# Patient Record
Sex: FEMALE | Race: WHITE | NOT HISPANIC OR LATINO | Employment: OTHER | URBAN - NONMETROPOLITAN AREA
[De-identification: names, ages, dates, MRNs, and addresses within clinical notes are randomized per-mention and may not be internally consistent; named-entity substitution may affect disease eponyms.]

---

## 2020-09-21 ENCOUNTER — APPOINTMENT (OUTPATIENT)
Dept: CT IMAGING | Facility: HOSPITAL | Age: 75
End: 2020-09-21

## 2020-09-21 ENCOUNTER — HOSPITAL ENCOUNTER (EMERGENCY)
Facility: HOSPITAL | Age: 75
Discharge: HOME | End: 2020-09-21
Attending: FAMILY MEDICINE | Admitting: FAMILY MEDICINE

## 2020-09-21 ENCOUNTER — APPOINTMENT (OUTPATIENT)
Dept: GENERAL RADIOLOGY | Facility: HOSPITAL | Age: 75
End: 2020-09-21

## 2020-09-21 VITALS
WEIGHT: 194 LBS | DIASTOLIC BLOOD PRESSURE: 73 MMHG | BODY MASS INDEX: 36.63 KG/M2 | TEMPERATURE: 98.4 F | HEIGHT: 61 IN | SYSTOLIC BLOOD PRESSURE: 170 MMHG | OXYGEN SATURATION: 98 % | RESPIRATION RATE: 18 BRPM | HEART RATE: 89 BPM

## 2020-09-21 DIAGNOSIS — R53.83 FATIGUE, UNSPECIFIED TYPE: Primary | ICD-10-CM

## 2020-09-21 DIAGNOSIS — R16.0 LIVER MASS: ICD-10-CM

## 2020-09-21 DIAGNOSIS — R50.9 RECURRENT FEVER OF UNKNOWN ETIOLOGY: ICD-10-CM

## 2020-09-21 DIAGNOSIS — R59.9 ENLARGED LYMPH NODE: ICD-10-CM

## 2020-09-21 LAB
ALBUMIN SERPL-MCNC: 3.1 G/DL (ref 3.5–5.2)
ALBUMIN/GLOB SERPL: 0.8 G/DL
ALP SERPL-CCNC: 309 U/L (ref 39–117)
ALT SERPL W P-5'-P-CCNC: 39 U/L (ref 1–33)
ANION GAP SERPL CALCULATED.3IONS-SCNC: 10 MMOL/L (ref 5–15)
AST SERPL-CCNC: 30 U/L (ref 1–32)
BASOPHILS # BLD AUTO: 0.14 10*3/MM3 (ref 0–0.2)
BASOPHILS NFR BLD AUTO: 0.7 % (ref 0–1.5)
BILIRUB SERPL-MCNC: 0.2 MG/DL (ref 0–1.2)
BILIRUB UR QL STRIP: NEGATIVE
BUN SERPL-MCNC: 9 MG/DL (ref 8–23)
BUN/CREAT SERPL: 21.4 (ref 7–25)
CALCIUM SPEC-SCNC: 9.3 MG/DL (ref 8.6–10.5)
CHLORIDE SERPL-SCNC: 101 MMOL/L (ref 98–107)
CLARITY UR: CLEAR
CO2 SERPL-SCNC: 28 MMOL/L (ref 22–29)
COLOR UR: YELLOW
CREAT SERPL-MCNC: 0.42 MG/DL (ref 0.57–1)
D-DIMER, QUANTITATIVE (MAD,POW, STR): 2466 NG/ML (FEU) (ref 0–470)
DEPRECATED RDW RBC AUTO: 47.2 FL (ref 37–54)
EOSINOPHIL # BLD AUTO: 0.35 10*3/MM3 (ref 0–0.4)
EOSINOPHIL NFR BLD AUTO: 1.7 % (ref 0.3–6.2)
ERYTHROCYTE [DISTWIDTH] IN BLOOD BY AUTOMATED COUNT: 15.2 % (ref 12.3–15.4)
GFR SERPL CREATININE-BSD FRML MDRD: 147 ML/MIN/1.73
GLOBULIN UR ELPH-MCNC: 3.8 GM/DL
GLUCOSE SERPL-MCNC: 107 MG/DL (ref 65–99)
GLUCOSE UR STRIP-MCNC: NEGATIVE MG/DL
HCT VFR BLD AUTO: 29.9 % (ref 34–46.6)
HGB BLD-MCNC: 9.7 G/DL (ref 12–15.9)
HGB UR QL STRIP.AUTO: NEGATIVE
HOLD SPECIMEN: NORMAL
IMM GRANULOCYTES # BLD AUTO: 0.91 10*3/MM3 (ref 0–0.05)
IMM GRANULOCYTES NFR BLD AUTO: 4.4 % (ref 0–0.5)
KETONES UR QL STRIP: NEGATIVE
LEUKOCYTE ESTERASE UR QL STRIP.AUTO: NEGATIVE
LIPASE SERPL-CCNC: 47 U/L (ref 13–60)
LYMPHOCYTES # BLD AUTO: 2.71 10*3/MM3 (ref 0.7–3.1)
LYMPHOCYTES NFR BLD AUTO: 13.2 % (ref 19.6–45.3)
MAGNESIUM SERPL-MCNC: 1.9 MG/DL (ref 1.6–2.4)
MCH RBC QN AUTO: 27.3 PG (ref 26.6–33)
MCHC RBC AUTO-ENTMCNC: 32.4 G/DL (ref 31.5–35.7)
MCV RBC AUTO: 84.2 FL (ref 79–97)
MONOCYTES # BLD AUTO: 1.78 10*3/MM3 (ref 0.1–0.9)
MONOCYTES NFR BLD AUTO: 8.7 % (ref 5–12)
NEUTROPHILS NFR BLD AUTO: 14.65 10*3/MM3 (ref 1.7–7)
NEUTROPHILS NFR BLD AUTO: 71.3 % (ref 42.7–76)
NITRITE UR QL STRIP: NEGATIVE
NRBC BLD AUTO-RTO: 0 /100 WBC (ref 0–0.2)
NT-PROBNP SERPL-MCNC: 282.7 PG/ML (ref 0–1800)
PH UR STRIP.AUTO: 6 [PH] (ref 5–9)
PLATELET # BLD AUTO: 597 10*3/MM3 (ref 140–450)
PMV BLD AUTO: 9.4 FL (ref 6–12)
POTASSIUM SERPL-SCNC: 3.7 MMOL/L (ref 3.5–5.2)
PROT SERPL-MCNC: 6.9 G/DL (ref 6–8.5)
PROT UR QL STRIP: NEGATIVE
RBC # BLD AUTO: 3.55 10*6/MM3 (ref 3.77–5.28)
SODIUM SERPL-SCNC: 139 MMOL/L (ref 136–145)
SP GR UR STRIP: 1.01 (ref 1–1.03)
TROPONIN T SERPL-MCNC: <0.01 NG/ML (ref 0–0.03)
TROPONIN T SERPL-MCNC: <0.01 NG/ML (ref 0–0.03)
UROBILINOGEN UR QL STRIP: NORMAL
WBC # BLD AUTO: 20.54 10*3/MM3 (ref 3.4–10.8)

## 2020-09-21 PROCEDURE — 71046 X-RAY EXAM CHEST 2 VIEWS: CPT

## 2020-09-21 PROCEDURE — 81003 URINALYSIS AUTO W/O SCOPE: CPT | Performed by: NURSE PRACTITIONER

## 2020-09-21 PROCEDURE — 93005 ELECTROCARDIOGRAM TRACING: CPT | Performed by: NURSE PRACTITIONER

## 2020-09-21 PROCEDURE — 93010 ELECTROCARDIOGRAM REPORT: CPT | Performed by: INTERNAL MEDICINE

## 2020-09-21 PROCEDURE — 0 IOPAMIDOL PER 1 ML: Performed by: FAMILY MEDICINE

## 2020-09-21 PROCEDURE — 80053 COMPREHEN METABOLIC PANEL: CPT | Performed by: NURSE PRACTITIONER

## 2020-09-21 PROCEDURE — 99284 EMERGENCY DEPT VISIT MOD MDM: CPT

## 2020-09-21 PROCEDURE — 83735 ASSAY OF MAGNESIUM: CPT | Performed by: NURSE PRACTITIONER

## 2020-09-21 PROCEDURE — 85379 FIBRIN DEGRADATION QUANT: CPT | Performed by: NURSE PRACTITIONER

## 2020-09-21 PROCEDURE — 71275 CT ANGIOGRAPHY CHEST: CPT

## 2020-09-21 PROCEDURE — 83880 ASSAY OF NATRIURETIC PEPTIDE: CPT | Performed by: NURSE PRACTITIONER

## 2020-09-21 PROCEDURE — 85025 COMPLETE CBC W/AUTO DIFF WBC: CPT | Performed by: NURSE PRACTITIONER

## 2020-09-21 PROCEDURE — 83690 ASSAY OF LIPASE: CPT | Performed by: NURSE PRACTITIONER

## 2020-09-21 PROCEDURE — 84484 ASSAY OF TROPONIN QUANT: CPT | Performed by: NURSE PRACTITIONER

## 2020-09-21 RX ORDER — SODIUM CHLORIDE 0.9 % (FLUSH) 0.9 %
10 SYRINGE (ML) INJECTION AS NEEDED
Status: DISCONTINUED | OUTPATIENT
Start: 2020-09-21 | End: 2020-09-21 | Stop reason: HOSPADM

## 2020-09-21 RX ADMIN — IOPAMIDOL 71 ML: 755 INJECTION, SOLUTION INTRAVENOUS at 12:50

## 2020-09-21 NOTE — ED PROVIDER NOTES
Subjective   Patient presents to the ER for c/o fever and night sweats for the past 2 weeks. She states she started feeling bad in July and has been on multiple rounds of antibiotics and steroids since. Per chart review patient's treatments have included:  7/12/20: Decadron, DepoMedrol, Omnicef for sinusitis and UTI  7/16/20: Zithromax, Prednisone, Doxycyline for pneumonia  7/31/20: Rocephin 2 GM, Solumedrol IV for pneumonia  8/1/20: Rocephin 2 GM IV  8/2/20: Rocephin 2 GM IV  8/3/20: Rocephin 2 GM IV, Prednisone, Cefdinir  8/26/20: Ceftin for possible bronchitis  8/31/20: Doxycycline for unspecified fever  9/16/20: Cefdinir for UTI  Testing has included a CBC and CMP on 7/30/20 which showed WBC at 16.4. COVID testing on 7/17 and 8/31 which were both negative, KATI and Cyclic Citrullinaton Peptied on 9/16 which were both negative, and UA on 7/30 and 9/16.  She also has CXR completed on 7/17, 7/30, and 8/31.   She states she continues with a dry non productive cough (productive yesterday x1) and shortness of breath since the beginning of July. She sees Dr. Christy (allergist in [place]) for her chronic cough. She reports 2 weeks ago she started with a daily fever of low 100 every day at noon and develop generalized body aches. She will take a Tylenol at that time. She states then again around [id number] her fever will return and she will take Aleve. Then around 0300 in the morning she will wake up with night sweats and her bed will be wet from her sweating. She states she will have to change clothes and her sheets. This has been a daily occurrence and her PCP felt she should come to the ER for further evaluation and work up.           Review of Systems   Constitutional: Positive for chills, fatigue and fever. Negative for activity change and unexpected weight change.   HENT: Negative.    Respiratory: Positive for shortness of breath. Negative for cough.    Cardiovascular: Negative.    Gastrointestinal: Negative for  abdominal pain.   Genitourinary: Positive for frequency. Negative for decreased urine volume, difficulty urinating, dysuria and pelvic pain.   Musculoskeletal: Positive for myalgias (generalized when fever present).   Skin: Negative.    Neurological: Negative.    Psychiatric/Behavioral: Negative.        Past Medical History:   Diagnosis Date   • Acute bronchitis    • Calcaneovalgus deformity of foot    • Cough    • H/O bone density study     [id number]   • Hammer toe    • Hyperlipidemia    • Ingrowing nail    • Other hammer toe(s) (acquired), unspecified foot    • Pes planus    • Pneumonia    • Rash and other nonspecific skin eruption    • Screening for osteoporosis        Allergies   Allergen Reactions   • Penicillins    • Pneumococcal Vaccines    • Quibron-T [Theophylline]        Past Surgical History:   Procedure Laterality Date   • ANKLE SURGERY     • CHOLECYSTECTOMY     • FOOT SURGERY  11/19/2013    Hammertoe correction second toe left foot with absorbable pin. Flexor tenotomy left third toe. Partial permanent nail avulsion both borders left hallux   • HEMORRHOIDECTOMY     • HYSTERECTOMY     • NOSE SURGERY         History reviewed. No pertinent family history.    Social History     Socioeconomic History   • Marital status:      Spouse name: Not on file   • Number of children: Not on file   • Years of education: Not on file   • Highest education level: Not on file   Tobacco Use   • Smoking status: Never Smoker   Substance and Sexual Activity   • Alcohol use: Never     Frequency: Never   • Drug use: Never           Objective   Physical Exam  Vitals signs and nursing note reviewed.   Constitutional:       General: She is not in acute distress.     Appearance: Normal appearance. She is well-developed. She is obese. She is not ill-appearing.   HENT:      Head: Normocephalic and atraumatic.   Neck:      Musculoskeletal: Normal range of motion and neck supple.   Cardiovascular:      Rate and Rhythm: Normal rate  and regular rhythm.      Heart sounds: Normal heart sounds. No murmur.   Pulmonary:      Effort: Pulmonary effort is normal. No respiratory distress.      Breath sounds: Normal breath sounds. No wheezing.      Comments: Appears short of breath with exertion  Abdominal:      General: Bowel sounds are normal. There is no distension.      Palpations: Abdomen is soft.      Tenderness: There is no abdominal tenderness.      Comments: Non tender to palpation   Musculoskeletal: Normal range of motion.   Skin:     General: Skin is warm and dry.      Capillary Refill: Capillary refill takes less than 2 seconds.   Neurological:      Mental Status: She is alert and oriented to person, place, and time.      Cranial Nerves: No cranial nerve deficit.      Sensory: No sensory deficit.      Motor: No weakness.      Coordination: Coordination normal.      Gait: Gait normal.   Psychiatric:         Behavior: Behavior normal.         Thought Content: Thought content normal.         Judgment: Judgment normal.         Procedures  Results for orders placed or performed during the hospital encounter of 09/21/20   Comprehensive Metabolic Panel    Specimen: Blood   Result Value Ref Range    Glucose 107 (H) 65 - 99 mg/dL    BUN 9 8 - 23 mg/dL    Creatinine 0.42 (L) 0.57 - 1.00 mg/dL    Sodium 139 136 - 145 mmol/L    Potassium 3.7 3.5 - 5.2 mmol/L    Chloride 101 98 - 107 mmol/L    CO2 28.0 22.0 - 29.0 mmol/L    Calcium 9.3 8.6 - 10.5 mg/dL    Total Protein 6.9 6.0 - 8.5 g/dL    Albumin 3.10 (L) 3.50 - 5.20 g/dL    ALT (SGPT) 39 (H) 1 - 33 U/L    AST (SGOT) 30 1 - 32 U/L    Alkaline Phosphatase 309 (H) 39 - 117 U/L    Total Bilirubin 0.2 0.0 - 1.2 mg/dL    eGFR Non African Amer 147 >60 mL/min/1.73    Globulin 3.8 gm/dL    A/G Ratio 0.8 g/dL    BUN/Creatinine Ratio 21.4 7.0 - 25.0    Anion Gap 10.0 5.0 - 15.0 mmol/L   Lipase    Specimen: Blood   Result Value Ref Range    Lipase 47 13 - 60 U/L   Urinalysis With Microscopic If Indicated (No  Culture) - Urine, Clean Catch    Specimen: Urine, Clean Catch   Result Value Ref Range    Color, UA Yellow Yellow, Straw, Dark Yellow, Rashida    Appearance, UA Clear Clear    pH, UA 6.0 5.0 - 9.0    Specific Gravity, UA 1.008 1.003 - 1.030    Glucose, UA Negative Negative    Ketones, UA Negative Negative    Bilirubin, UA Negative Negative    Blood, UA Negative Negative    Protein, UA Negative Negative    Leuk Esterase, UA Negative Negative    Nitrite, UA Negative Negative    Urobilinogen, UA 0.2 E.U./dL 0.2 - 1.0 E.U./dL   BNP    Specimen: Blood   Result Value Ref Range    proBNP 282.7 0.0-1,800.0 pg/mL   D-dimer, Quantitative    Specimen: Blood   Result Value Ref Range    D-Dimer, Quantitative 2,466 (H) 0 - 470 ng/mL (FEU)   Troponin    Specimen: Blood   Result Value Ref Range    Troponin T <0.010 0.000 - 0.030 ng/mL   Troponin    Specimen: Arm, Left; Blood   Result Value Ref Range    Troponin T <0.010 0.000 - 0.030 ng/mL   Magnesium    Specimen: Blood   Result Value Ref Range    Magnesium 1.9 1.6 - 2.4 mg/dL   CBC Auto Differential    Specimen: Blood   Result Value Ref Range    WBC 20.54 (H) 3.40 - 10.80 10*3/mm3    RBC 3.55 (L) 3.77 - 5.28 10*6/mm3    Hemoglobin 9.7 (L) 12.0 - 15.9 g/dL    Hematocrit 29.9 (L) 34.0 - 46.6 %    MCV 84.2 79.0 - 97.0 fL    MCH 27.3 26.6 - 33.0 pg    MCHC 32.4 31.5 - 35.7 g/dL    RDW 15.2 12.3 - 15.4 %    RDW-SD 47.2 37.0 - 54.0 fl    MPV 9.4 6.0 - 12.0 fL    Platelets 597 (H) 140 - 450 10*3/mm3    Neutrophil % 71.3 42.7 - 76.0 %    Lymphocyte % 13.2 (L) 19.6 - 45.3 %    Monocyte % 8.7 5.0 - 12.0 %    Eosinophil % 1.7 0.3 - 6.2 %    Basophil % 0.7 0.0 - 1.5 %    Immature Grans % 4.4 (H) 0.0 - 0.5 %    Neutrophils, Absolute 14.65 (H) 1.70 - 7.00 10*3/mm3    Lymphocytes, Absolute 2.71 0.70 - 3.10 10*3/mm3    Monocytes, Absolute 1.78 (H) 0.10 - 0.90 10*3/mm3    Eosinophils, Absolute 0.35 0.00 - 0.40 10*3/mm3    Basophils, Absolute 0.14 0.00 - 0.20 10*3/mm3    Immature Grans, Absolute 0.91  (H) 0.00 - 0.05 10*3/mm3    nRBC 0.0 0.0 - 0.2 /100 WBC   Gold Top - SST   Result Value Ref Range    Extra Tube Hold for add-ons.      Xr Chest 2 View    Result Date: 9/21/2020  Narrative: PROCEDURE: Chest x-ray with two views. INDICATION: Shortness of breath. Fever. COMPARISON: 8/31/2020 chest x-ray and earlier exams dating to 12/9/2015.  Heart size normal with normal pulmonary vascularity. New findings of mild subsegmental atelectasis right base and mild infiltrate peripherally near the CP angle. Left lung clear. No pleural effusion. Bony structures unremarkable.     Impression: New findings of mild right basilar atelectasis and infiltrate. [id number] Electronically signed by:  Griffin Stovall MD  9/21/2020 10:49 AM CDT Workstation: [id number]    Ct Angiogram Chest    Result Date: 9/21/2020  Narrative: PROCEDURE: CT ANGIOGRAM CHEST .      EXAM:  Computed Tomography with CTA       REGION:  Chest     INDICATION:   Elevated d-dimer, shortness of breath  - rule out pulmonary embolism     CORRELATIVE IMAGING:  None                        TECHNIQUE:           - PE / vascular protocol             - reconstructions:  axial, coronal, sagittal, obliques   - computer-generated 3D reconstructions (MIPS) were performed.            - contrast:  intravenous 71 mL of Isovue-370                     This exam was performed according to our departmental dose-optimization program, which includes automated exposure control, adjustment of the mA and/or kV according to patient size and/or use of iterative reconstruction technique. DLP is 411.9           COMMENTS:                           - Pulmonary arterial system:     - Main pulmonary artery trunk:  negative     - Left, right main pulmonary arteries: Equivocal nonocclusive filling defect in right middle lobar branch     -Limited evaluation of lobar and segmental arteries due to contrast phase. Most of the contrast is in the SVC.  - Systemic vascularity (as visualized):      - Aorta:   grossly negative / normal caliber / no dissection      - roots of great vessels:  grossly negative / normal caliber     - SVC / IVC:  grossly negative / normal caliber  Heart is mildly enlarged. Small pericardial effusion.    - Misc (limited visualization):     - pulmonary parenchyma:  negative there is an ovoid soft tissue attenuating structure adjacent to the left neural foramen at T4-T5.     - pleura:  Trace right pleural effusion     - mediastinal / bethany: Enlarged precarinal lymph node measuring 1.5 x 2.0 cm in greatest axial dimension    - neck, inferior:  grossly wnl     - subdiaphragmatic structures: Low-attenuation masslike lesion in the posterior medial right lobe the liver measuring at least 8.2 x 7.7 x 9.3 cm. This may represent multiple adjacent lesions or one large lobulated lesion. This is of uncertain etiology, and only faintly visualized due to contrast phase  - osseous:  No acute abnormality identified   .      Impression: 1.  No evidence of large central pulmonary embolism, with some limitation of evaluation in lobar and especially segmental branches due to contrast phase. An equivocal nonocclusive filling defect is seen in a right middle lobar branch , best seen on axial images (CT series, image #68) 2.  No evidence of acute pathology associated with the visualized aorta.    3. Ovoid, soft tissue attenuation structure posterior medially in the left upper thorax, adjacent to the neural foramina at T4-T5. Is difficult to ascertain the origin of this structure, which could represent a pulmonary nodule, but could also be neural in etiology as a schwannoma or neurofibroma, which are less frequently seen in the thoracic than the cervical and lumbar spine. 4. Mildly enlarged or vascular lymph node of uncertain etiology or clinical evidence. 5. Large area of low attenuation within the right lobe of the liver, thought unlikely to represent an area of focal fatty infiltration, appear more masslike than  typically seen for fatty infiltration, and the lesion may be lobulated or there may be multiple adjacent lesions. Hepatic malignancy, either benign or metastatic, is not excluded. Multiphasic contrast enhanced CT or MRI may be helpful in further evaluation 6. Small hiatal hernia 7. Small pericardial effusion 8. Small right pleural effusion 9. Please see findings section above for further details Findings discussed with Soumya James in the emergency department at 2:40 PM EST on 9/21/2020 Electronically signed by:  Lucia Rodriguez MD  9/21/2020 1:46 PM CDT Workstation: [id number]             ED Course  ED Course as of Sep 21 2240   Mon Sep 21, 2020   1324 Patient with multiple rounds of steroids in the past several months.    WBC(!): 20.54 [SH]   1542 Rectal exam completed with tech in room. Negative hemoccult.     [SH]   1552 Patient reviewed and discussed with Dr. Pang. Patient WBC is felt to be steroid related at this time. Occult blood is negative. Patient will require further work up outpatient for liver mass and CTA findings.     [SH]   1553 Work up discussed with patient. Offered possible admission but patient states she would prefer to go home and follow up with Dr. Garcia tomorrow for further work up and evaluation. I feel this is appropriate. Advised to call for appointment. Discussed with patient when to return to ER and patient verbalized understanding.     [SH]      ED Course User Index  [SH] Soumya James, PAULINO                                           [facility]    Final diagnoses:   Fatigue, unspecified type   Recurrent fever of unknown etiology   Liver mass   Enlarged lymph node            Soumya James APRN  09/21/20 2240

## 2020-09-21 NOTE — ED NOTES
Patient presents to ED with complaint of fever for 2 weeks and generalized weakness. She states she was worked up at Dr Vines office at [facility] and he sent her here for more testing. She states her fever will come around noon and she will take a tylenol and in the afternoon she will have to take aleeve due to the fever returning. She states she is having night sweats. She states she is taking Cefdenir 300 mg BID for a urinary tract infection. She states she had pneumonia 3-4 weeks ago.      Sheila Escamilla RN  09/21/20 [id number]       Sheila Escamilla RN  09/21/20 4257

## 2020-09-21 NOTE — DISCHARGE INSTRUCTIONS
Your work up today showed her hemoglobin and hematocrit was low. Your stool was negative for blood. You CT showed an enlarged lymph node and a mass in your liver. Follow up with your primary care tomorrow for further evaluation and treatment. Return to the ER if symptoms worsen.

## 2020-10-05 PROBLEM — A49.1 STREPTOCOCCUS INFECTION: Status: ACTIVE | Noted: 2020-10-05

## 2020-10-06 ENCOUNTER — INFUSION (OUTPATIENT)
Dept: ONCOLOGY | Facility: HOSPITAL | Age: 75
End: 2020-10-06

## 2020-10-06 VITALS
RESPIRATION RATE: 22 BRPM | TEMPERATURE: 97.9 F | DIASTOLIC BLOOD PRESSURE: 69 MMHG | SYSTOLIC BLOOD PRESSURE: 164 MMHG | OXYGEN SATURATION: 96 % | HEART RATE: 83 BPM

## 2020-10-06 DIAGNOSIS — A49.1 STREPTOCOCCUS INFECTION: Primary | ICD-10-CM

## 2020-10-06 PROCEDURE — 25010000002 CEFTRIAXONE PER 250 MG: Performed by: INTERNAL MEDICINE

## 2020-10-06 PROCEDURE — 96365 THER/PROPH/DIAG IV INF INIT: CPT | Performed by: NURSE PRACTITIONER

## 2020-10-06 RX ADMIN — CEFTRIAXONE SODIUM 2 G: 2 INJECTION, POWDER, FOR SOLUTION INTRAMUSCULAR; INTRAVENOUS at 10:45

## 2020-10-07 ENCOUNTER — INFUSION (OUTPATIENT)
Dept: ONCOLOGY | Facility: HOSPITAL | Age: 75
End: 2020-10-07

## 2020-10-07 VITALS
DIASTOLIC BLOOD PRESSURE: 67 MMHG | TEMPERATURE: 97 F | SYSTOLIC BLOOD PRESSURE: 154 MMHG | RESPIRATION RATE: 18 BRPM | HEART RATE: 95 BPM

## 2020-10-07 DIAGNOSIS — A49.1 STREPTOCOCCUS INFECTION: Primary | ICD-10-CM

## 2020-10-07 PROCEDURE — 96365 THER/PROPH/DIAG IV INF INIT: CPT | Performed by: INTERNAL MEDICINE

## 2020-10-07 PROCEDURE — 25010000002 CEFTRIAXONE PER 250 MG: Performed by: INTERNAL MEDICINE

## 2020-10-07 RX ADMIN — CEFTRIAXONE SODIUM 2 G: 2 INJECTION, POWDER, FOR SOLUTION INTRAMUSCULAR; INTRAVENOUS at 09:34

## 2020-10-08 ENCOUNTER — INFUSION (OUTPATIENT)
Dept: ONCOLOGY | Facility: HOSPITAL | Age: 75
End: 2020-10-08

## 2020-10-08 VITALS
TEMPERATURE: 98.3 F | DIASTOLIC BLOOD PRESSURE: 66 MMHG | SYSTOLIC BLOOD PRESSURE: 173 MMHG | RESPIRATION RATE: 16 BRPM | HEART RATE: 97 BPM

## 2020-10-08 DIAGNOSIS — A49.1 STREPTOCOCCUS INFECTION: Primary | ICD-10-CM

## 2020-10-08 PROCEDURE — 96365 THER/PROPH/DIAG IV INF INIT: CPT | Performed by: NURSE PRACTITIONER

## 2020-10-08 PROCEDURE — 25010000002 CEFTRIAXONE PER 250 MG: Performed by: INTERNAL MEDICINE

## 2020-10-08 RX ADMIN — CEFTRIAXONE SODIUM 2 G: 2 INJECTION, POWDER, FOR SOLUTION INTRAMUSCULAR; INTRAVENOUS at 13:37

## 2020-10-09 ENCOUNTER — INFUSION (OUTPATIENT)
Dept: ONCOLOGY | Facility: HOSPITAL | Age: 75
End: 2020-10-09

## 2020-10-09 VITALS
RESPIRATION RATE: 22 BRPM | DIASTOLIC BLOOD PRESSURE: 67 MMHG | SYSTOLIC BLOOD PRESSURE: 163 MMHG | TEMPERATURE: 97.8 F | HEART RATE: 98 BPM

## 2020-10-09 DIAGNOSIS — A49.1 STREPTOCOCCUS INFECTION: Primary | ICD-10-CM

## 2020-10-09 PROCEDURE — 25010000002 CEFTRIAXONE PER 250 MG: Performed by: INTERNAL MEDICINE

## 2020-10-09 PROCEDURE — 96365 THER/PROPH/DIAG IV INF INIT: CPT | Performed by: NURSE PRACTITIONER

## 2020-10-09 RX ADMIN — CEFTRIAXONE SODIUM 2 G: 2 INJECTION, POWDER, FOR SOLUTION INTRAMUSCULAR; INTRAVENOUS at 13:38

## 2020-10-10 ENCOUNTER — INFUSION (OUTPATIENT)
Dept: ONCOLOGY | Facility: HOSPITAL | Age: 75
End: 2020-10-10

## 2020-10-10 VITALS — DIASTOLIC BLOOD PRESSURE: 72 MMHG | HEART RATE: 100 BPM | SYSTOLIC BLOOD PRESSURE: 158 MMHG | TEMPERATURE: 96.8 F

## 2020-10-10 DIAGNOSIS — A49.1 STREPTOCOCCUS INFECTION: Primary | ICD-10-CM

## 2020-10-10 PROCEDURE — 25010000002 CEFTRIAXONE PER 250 MG: Performed by: INTERNAL MEDICINE

## 2020-10-10 PROCEDURE — 96365 THER/PROPH/DIAG IV INF INIT: CPT | Performed by: INTERNAL MEDICINE

## 2020-10-10 RX ADMIN — CEFTRIAXONE SODIUM 2 G: 2 INJECTION, POWDER, FOR SOLUTION INTRAMUSCULAR; INTRAVENOUS at 10:18

## 2020-10-11 ENCOUNTER — INFUSION (OUTPATIENT)
Dept: ONCOLOGY | Facility: HOSPITAL | Age: 75
End: 2020-10-11

## 2020-10-11 VITALS — HEART RATE: 91 BPM | DIASTOLIC BLOOD PRESSURE: 63 MMHG | SYSTOLIC BLOOD PRESSURE: 135 MMHG

## 2020-10-11 DIAGNOSIS — A49.1 STREPTOCOCCUS INFECTION: Primary | ICD-10-CM

## 2020-10-11 PROCEDURE — 96365 THER/PROPH/DIAG IV INF INIT: CPT | Performed by: NURSE PRACTITIONER

## 2020-10-11 PROCEDURE — 25010000002 CEFTRIAXONE PER 250 MG: Performed by: INTERNAL MEDICINE

## 2020-10-11 RX ADMIN — CEFTRIAXONE SODIUM 2 G: 2 INJECTION, POWDER, FOR SOLUTION INTRAMUSCULAR; INTRAVENOUS at 10:16

## 2020-10-12 ENCOUNTER — INFUSION (OUTPATIENT)
Dept: ONCOLOGY | Facility: HOSPITAL | Age: 75
End: 2020-10-12

## 2020-10-12 VITALS
TEMPERATURE: 98.6 F | RESPIRATION RATE: 20 BRPM | SYSTOLIC BLOOD PRESSURE: 176 MMHG | DIASTOLIC BLOOD PRESSURE: 75 MMHG | HEART RATE: 89 BPM | OXYGEN SATURATION: 95 %

## 2020-10-12 DIAGNOSIS — A49.1 STREPTOCOCCUS INFECTION: Primary | ICD-10-CM

## 2020-10-12 PROCEDURE — 25010000002 CEFTRIAXONE PER 250 MG: Performed by: INTERNAL MEDICINE

## 2020-10-12 PROCEDURE — 96365 THER/PROPH/DIAG IV INF INIT: CPT | Performed by: NURSE PRACTITIONER

## 2020-10-12 RX ADMIN — CEFTRIAXONE SODIUM 2 G: 2 INJECTION, POWDER, FOR SOLUTION INTRAMUSCULAR; INTRAVENOUS at 14:30

## 2020-10-13 ENCOUNTER — INFUSION (OUTPATIENT)
Dept: ONCOLOGY | Facility: HOSPITAL | Age: 75
End: 2020-10-13

## 2020-10-13 VITALS
DIASTOLIC BLOOD PRESSURE: 70 MMHG | SYSTOLIC BLOOD PRESSURE: 168 MMHG | HEART RATE: 95 BPM | TEMPERATURE: 97.6 F | RESPIRATION RATE: 22 BRPM

## 2020-10-13 DIAGNOSIS — A49.1 STREPTOCOCCUS INFECTION: Primary | ICD-10-CM

## 2020-10-13 PROCEDURE — 96365 THER/PROPH/DIAG IV INF INIT: CPT | Performed by: NURSE PRACTITIONER

## 2020-10-13 PROCEDURE — 25010000002 CEFTRIAXONE PER 250 MG: Performed by: INTERNAL MEDICINE

## 2020-10-13 RX ADMIN — CEFTRIAXONE SODIUM 2 G: 2 INJECTION, POWDER, FOR SOLUTION INTRAMUSCULAR; INTRAVENOUS at 10:39

## 2020-10-14 ENCOUNTER — INFUSION (OUTPATIENT)
Dept: ONCOLOGY | Facility: HOSPITAL | Age: 75
End: 2020-10-14

## 2020-10-14 VITALS
SYSTOLIC BLOOD PRESSURE: 169 MMHG | TEMPERATURE: 97 F | DIASTOLIC BLOOD PRESSURE: 74 MMHG | HEART RATE: 85 BPM | RESPIRATION RATE: 22 BRPM

## 2020-10-14 DIAGNOSIS — A49.1 STREPTOCOCCUS INFECTION: Primary | ICD-10-CM

## 2020-10-14 PROCEDURE — 25010000002 CEFTRIAXONE PER 250 MG: Performed by: INTERNAL MEDICINE

## 2020-10-14 PROCEDURE — 96365 THER/PROPH/DIAG IV INF INIT: CPT | Performed by: NURSE PRACTITIONER

## 2020-10-14 RX ADMIN — CEFTRIAXONE SODIUM 2 G: 2 INJECTION, POWDER, FOR SOLUTION INTRAMUSCULAR; INTRAVENOUS at 11:51

## 2020-10-15 ENCOUNTER — INFUSION (OUTPATIENT)
Dept: ONCOLOGY | Facility: HOSPITAL | Age: 75
End: 2020-10-15

## 2020-10-15 VITALS
HEART RATE: 96 BPM | DIASTOLIC BLOOD PRESSURE: 69 MMHG | TEMPERATURE: 97.6 F | RESPIRATION RATE: 20 BRPM | SYSTOLIC BLOOD PRESSURE: 158 MMHG

## 2020-10-15 DIAGNOSIS — A49.1 STREPTOCOCCUS INFECTION: Primary | ICD-10-CM

## 2020-10-15 PROCEDURE — 25010000002 CEFTRIAXONE PER 250 MG: Performed by: INTERNAL MEDICINE

## 2020-10-15 PROCEDURE — 96365 THER/PROPH/DIAG IV INF INIT: CPT | Performed by: NURSE PRACTITIONER

## 2020-10-15 RX ADMIN — CEFTRIAXONE SODIUM 2 G: 2 INJECTION, POWDER, FOR SOLUTION INTRAMUSCULAR; INTRAVENOUS at 10:55

## 2022-07-05 DIAGNOSIS — R06.00 DYSPNEA, UNSPECIFIED TYPE: ICD-10-CM

## 2022-07-05 DIAGNOSIS — J45.909 MILD ASTHMA, UNSPECIFIED WHETHER COMPLICATED, UNSPECIFIED WHETHER PERSISTENT: ICD-10-CM

## 2022-07-05 DIAGNOSIS — R05.3 CHRONIC COUGH: Primary | ICD-10-CM

## 2022-10-04 ENCOUNTER — TRANSCRIBE ORDERS (OUTPATIENT)
Dept: SPEECH THERAPY | Facility: HOSPITAL | Age: 77
End: 2022-10-04

## 2022-10-04 DIAGNOSIS — J38.3 VOCAL CORD DYSFUNCTION: Primary | ICD-10-CM

## 2022-10-05 ENCOUNTER — HOSPITAL ENCOUNTER (OUTPATIENT)
Dept: SPEECH THERAPY | Facility: HOSPITAL | Age: 77
Setting detail: THERAPIES SERIES
Discharge: HOME | End: 2022-10-05

## 2022-10-05 NOTE — SIGNIFICANT NOTE
Communication Note:   Pt arrived for speech evaluation due to vocal cord dysfunction however pt has not been since by an ENT. Pt has had no visualization of vocal cords and presents with hoarseness. SLP discussed the need for examination by ENT before evaluation and treatment plan can be established. SLP will call for ENT referral if needed. Pt to call SLP if help needed for ENT referral. Pt in agreement and understanding of needing to see ENT first. Thank you!     Kia Pan CCC-SLP 10/5/2022 12:01 CDT

## 2022-10-17 ENCOUNTER — OFFICE VISIT (OUTPATIENT)
Dept: OTOLARYNGOLOGY | Facility: CLINIC | Age: 77
End: 2022-10-17

## 2022-10-17 VITALS — WEIGHT: 204 LBS | BODY MASS INDEX: 38.55 KG/M2 | HEART RATE: 94 BPM | OXYGEN SATURATION: 96 %

## 2022-10-17 DIAGNOSIS — R49.0 MUSCLE TENSION DYSPHONIA: ICD-10-CM

## 2022-10-17 DIAGNOSIS — R49.0 DYSPHONIA: Primary | ICD-10-CM

## 2022-10-17 DIAGNOSIS — K21.9 LPRD (LARYNGOPHARYNGEAL REFLUX DISEASE): ICD-10-CM

## 2022-10-17 DIAGNOSIS — R05.3 CHRONIC COUGH: ICD-10-CM

## 2022-10-17 PROCEDURE — 31575 DIAGNOSTIC LARYNGOSCOPY: CPT | Performed by: OTOLARYNGOLOGY

## 2022-10-17 PROCEDURE — 99204 OFFICE O/P NEW MOD 45 MIN: CPT | Performed by: OTOLARYNGOLOGY

## 2022-10-17 RX ORDER — LOSARTAN POTASSIUM AND HYDROCHLOROTHIAZIDE 12.5; 5 MG/1; MG/1
1 TABLET ORAL DAILY
Qty: 30 TABLET | Refills: 11 | COMMUNITY
Start: 2022-10-03 | End: 2023-10-04

## 2022-10-17 RX ORDER — VIT C/HESPERIDIN/BIOFLAVONOIDS 500-100 MG
TABLET ORAL
COMMUNITY

## 2022-10-17 RX ORDER — IPRATROPIUM BROMIDE 21 UG/1
2 SPRAY, METERED NASAL
COMMUNITY
Start: 2022-09-01

## 2022-10-17 RX ORDER — FENOFIBRATE 160 MG/1
160 TABLET ORAL
COMMUNITY
Start: 2022-05-06 | End: 2023-05-07

## 2022-10-17 RX ORDER — VENLAFAXINE 37.5 MG/1
37.5 TABLET ORAL DAILY
COMMUNITY
Start: 2022-09-01

## 2022-10-17 RX ORDER — MONTELUKAST SODIUM 10 MG/1
10 TABLET ORAL
COMMUNITY
Start: 2022-10-03 | End: 2023-10-04

## 2022-10-17 RX ORDER — ASCORBIC ACID 500 MG
500 TABLET ORAL DAILY
COMMUNITY

## 2022-10-17 RX ORDER — FAMOTIDINE 20 MG/1
40 TABLET, FILM COATED ORAL NIGHTLY
Qty: 60 TABLET | Refills: 3 | Status: SHIPPED | OUTPATIENT
Start: 2022-10-17

## 2022-10-17 RX ORDER — ATORVASTATIN CALCIUM 10 MG/1
TABLET, FILM COATED ORAL
COMMUNITY
Start: 2022-09-19

## 2022-10-17 RX ORDER — VITAMIN E 268 MG
CAPSULE ORAL
COMMUNITY

## 2022-10-17 RX ORDER — LATANOPROST 50 UG/ML
SOLUTION/ DROPS OPHTHALMIC
COMMUNITY

## 2022-10-17 NOTE — PROGRESS NOTES
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 Serrano is a 77F with a past medical history including HLD presenting for evaluation of hoarseness.  She states that ever since she had an upper respiratory tract infection in March of this year she has developed a persistent cough that is no longer productive and hoarseness that does fluctuate over the course of the day but is persistent.  She has recently started on ipratropium bromide nasal spray by her allergist which has helped her postnasal drip and rhinorrhea and she feels that this is also improved the frequency and severity of her cough.  She denies dyspnea, dysphagia, odynophagia, sore throat, ear pain, otorrhea, but does endorse some nasal congestion, postnasal drip, and rhinorrhea.  She states that the cough is significantly improved but still persistent she does have this today occasionally.  She states that her hoarseness is present all of the time but does fluctuate throughout the day depending on how much postnasal drip she has.  She does not have any pain with phonation or swallow.  She does endorse feeling like she has to strain or struggle to speak at times.  This is also new since about March.  She denies coughing, choking, or gagging with swallow.  She states she did undergo a nasal bone spur removal on the right in 1991 for  a contact point.  She states her symptoms recurred and she underwent some other kind of nasal surgery in the late 90s.  She does not use any nasal saline rinses or sprays.  She does not use any nasal steroid spray.  She is not and has not ever been a tobacco user.  No other acute ENT concerns today.    Vital Signs   Vitals:    10/17/22 1005   Pulse: 94   SpO2: 96%     Physical Exam:  General: NAD, awake and alert  Head: normocephalic, atraumatic  Eyes: EOMI, sclerae white, conjunctivae pink  Ears: pinnae intact without masses or lesions   Right: TM intact without injection or effusion   Left: TM intact without injection or effusion  Nose: external straight without deformity   Mucosa pink, not edematous. No polyps seen. No purulence.   Septum: midline   Turbinates: not hypertrophied  OC/OP: mucosa moist and pink, no masses or lesions, tongue is midline and mobile. Tonsils 2+ without exudate. Uvula single and elevates symmetrically.  Neck: supple, no masses or lesions. Thyroid without palpable masses.  Neuro: CN II - XII grossly intact, no focal deficits    Procedure: Flexible Fiberoptic Laryngoscopy  Indications: dysphonia, hoarseness  Anesthesia: Local  Surgeon: Ailyn Benson MD  EBL: none  Complications: none    Description:  Informed consent was verbally obtained. The nose was topically decongested with Neosynephrine and anesthetized with 4% lidocaine. The flexible endoscope was placed into the nasal cavity, and advanced to visualize the nasopharynx, oropharynx, hypopharynx, and larynx. The endoscope was removed at the conclusion of the exam.  Findings are as below:    Nasal cavity: Normal septum, normal turbinates, no evidence of polyps  Nasopharynx: Normal adenoid area and normal Eustachian tubes. No masses or lesions.  Oropharynx/Hypopharynx: BOT without masses, lesions, or edema. No masses or lesions of hypopharynx. No pooling of secretions. Posterior pharyngeal wall with without erythema, edema, or  cobblestoning.  Larynx: Vallecula is normal.  Epiglottis thin and crisp. Supraglottis without masses, lesions, or edema. There is supraglottic squeeze with phonation and mild hooding of arytenoids at baseline without paresis or paralysis of TVFs.  Visualized subglottis without masses or lesions.  Interarytenoid area with pachydermia without scarring. True vocal folds appropriately mobile with phonation bilaterally.  Mucosa normal. No mucus stranding.  No masses or lesions.    Results Review:  Primary care physician note from 10/3/2022 reviewed today and demonstrates history of allergic rhinitis managed with subcutaneous immunotherapy, negative IgE blood testing, a history of asthma now managed with as needed albuterol inhaler, and a persistent cough and hoarseness since March 2022 reportedly seen by pulmonology and diagnosed as upper airway and source, previously on steroid inhaler no longer.  No benefit from Tessalon Perles. was sent to speech therapy, who requested ENT evaluation prior to initiating treatment.  SLP note requested ENT direct visualization prior to initiating therapy.    Review of Systems:  Positive ROS items: Congestion, postnasal drip, sneezing, voice change, cough, wheezing, arthritis, neck pain, and neck stiffness.  Otherwise, a 14 system ROS is negative except as pertinent positives are mentioned above.    Histories:  Allergies   Allergen Reactions   • Penicillins    • Pneumococcal Vaccines    • Quibron-T [Theophylline]        Prior to Admission medications    Medication Sig Start Date End Date Taking? Authorizing Provider   albuterol (PROVENTIL HFA;VENTOLIN HFA) 108 (90 Base) MCG/ACT inhaler Inhale 2 puffs Every 4 (Four) Hours As Needed for Wheezing.    ProviderManinder MD   Cetirizine HCl (ZYRTEC ALLERGY PO) Take  by mouth.    ProviderManinder MD   Cyanocobalamin (VITAMIN B 12 PO) Take  by mouth.    ProviderManinder MD   FLUoxetine (PROzac) 20 MG capsule Take 20 mg by mouth  Daily.    Maninder Ramirez MD   FLUoxetine HCl (PROZAC PO) Take  by mouth.    Maninder Ramirez MD   fluticasone (VERAMYST) 27.5 MCG/SPRAY nasal spray 2 sprays into each nostril Daily.    Maninder Ramirez MD   MAGNESIUM PO Take  by mouth.    Maninder Ramirez MD   Multiple Vitamins-Minerals (MULTIVITAMIN ADULT PO) Take  by mouth.    Maninder Ramirez MD   OMEGA-3 FATTY ACIDS PO Take  by mouth.    Maninder Ramirez MD   omeprazole OTC (PRILOSEC OTC) 20 MG EC tablet Take 20 mg by mouth Daily.    Maninder Ramirez MD       Past Medical History:   Diagnosis Date   • Acute bronchitis    • Calcaneovalgus deformity of foot    • Cough    • H/O bone density study     [id number]   • Hammer toe    • Hyperlipidemia    • Ingrowing nail    • Other hammer toe(s) (acquired), unspecified foot    • Pes planus    • Pneumonia    • Rash and other nonspecific skin eruption    • Screening for osteoporosis        Past Surgical History:   Procedure Laterality Date   • ANKLE SURGERY     • CHOLECYSTECTOMY     • FOOT SURGERY  11/19/2013    Hammertoe correction second toe left foot with absorbable pin. Flexor tenotomy left third toe. Partial permanent nail avulsion both borders left hallux   • HEMORRHOIDECTOMY     • HYSTERECTOMY     • NOSE SURGERY         Social History     Socioeconomic History   • Marital status:    Tobacco Use   • Smoking status: Never   Substance and Sexual Activity   • Alcohol use: Never   • Drug use: Never       No family history on file.          This document has been electronically signed by Ailyn Benson MD on October 17, 2022 09:56 CDT

## 2022-10-24 ENCOUNTER — HOSPITAL ENCOUNTER (OUTPATIENT)
Dept: SPEECH THERAPY | Facility: HOSPITAL | Age: 77
Setting detail: THERAPIES SERIES
Discharge: HOME | End: 2022-10-24

## 2022-10-24 DIAGNOSIS — R49.0 DYSPHONIA: ICD-10-CM

## 2022-10-24 PROCEDURE — 92524 BEHAVRAL QUALIT ANALYS VOICE: CPT | Performed by: SPEECH-LANGUAGE PATHOLOGIST

## 2022-10-24 NOTE — THERAPY EVALUATION
Outpatient Speech Language Pathology   Adult/Peds Voice Initial Evaluation  [facility]     Patient Name: Preethi Serrano  : 1945  MRN: [id number]  Today's Date: 10/24/2022         Visit Date: 10/24/2022   Patient Active Problem List   Diagnosis   • Streptococcus infection        Past Medical History:   Diagnosis Date   • Acute bronchitis    • Calcaneovalgus deformity of foot    • Cough    • H/O bone density study     [id number]   • Hammer toe    • Hyperlipidemia    • Ingrowing nail    • Other hammer toe(s) (acquired), unspecified foot    • Pes planus    • Pneumonia    • Rash and other nonspecific skin eruption    • Screening for osteoporosis         Past Surgical History:   Procedure Laterality Date   • ANKLE SURGERY     • CHOLECYSTECTOMY     • FOOT SURGERY  2013    Hammertoe correction second toe left foot with absorbable pin. Flexor tenotomy left third toe. Partial permanent nail avulsion both borders left hallux   • HEMORRHOIDECTOMY     • HYSTERECTOMY     • NOSE SURGERY           Visit Dx:    ICD-10-CM ICD-9-CM   1. Dysphonia  R49.0 784.42            OP SLP Assessment/Plan - 10/24/22 1315        SLP Assessment    Functional Problems Voice  -EK    Impact on Function- Voice Restrictions in personal and social life  -EK    Clinical Impression- Voice Mild moderate voice disorder  -EK    Functional Problems Comment muscle tension dysphonia  -EK    Clinical Impression Comments Pt presents with hoarseness since 2022 due to upper respiratory infection and excessive coughing which led to hoarseness that has not resolved. Pt does have allergies, asthma, GERD, and chronic cough since March. Pt seen by ENT. Pt would benefit from skilled ST to address muscle tension dysphonia. SLP provided cervical exercises this date to help reduce laryngeal tension. SLP will add exercises next session.  -EK    Prognosis Good (comment)  -EK    Patient/caregiver participated in establishment of treatment plan and  goals Yes  -EK    Patient would benefit from skilled therapy intervention Yes  -EK       SLP Plan    Frequency 1 time per week  -EK    Duration 4 weeks  -EK    Plan Comments ST to initiate plan of care  -EK          User Key  (r) = Recorded By, (t) = Taken By, (c) = Cosigned By    Initials Name Provider Type    EK Kia Pan CCC-SLP Speech and Language Pathologist                  VOICE ASSESSMENT (last 72 hours)     Voice Assessment     Row Name 10/24/22 8770                   Voice Assessment/Intervention    Document Type evaluation  -EK        Subjective Information complains of  hoarse voice  -EK        Patient Observations alert;cooperative;agree to therapy  -EK        Patient/Family/Caregiver Comments/Observations none  -EK        Patient Effort good  -EK           General Information    Patient Profile Reviewed yes  -EK        Pertinent History Of Current Problem Pt was seen by ENT. SLP will proceed with speech therapy to address hoarseness for muscle tension dysphonia. 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 Pt is currently taking zyrtec, singular, prilosec in am and just recently added Pepcid 40mg by Dr. Benson.  -EK        Current Method of Nutrition regular textures;thin liquids  -EK        Precautions/Limitations, Vision WFL  -EK        Precautions/Limitations, Hearing WFL  -EK        Prior Level of Function-Communication WFL  -EK        Plans/Goals Discussed with patient  -EK           Measures and Scales for Voice    Measures and Scales for Voice Voice Handicap Index  -EK           Voice Handicap Index    Functional Subtest Score 16  -EK        Physical Subtest Score 22  -EK        Emotional Subtest Score 11  -EK           Pain    Additional Documentation Pain Scale: Numbers Pre/Post-Treatment (Group)  -EK           Pain Scale: Numbers Pre/Post-Treatment    Pretreatment Pain Rating 0/10 - no pain  -EK        Posttreatment Pain Rating 0/10 - no pain  -EK            Paradoxical Vocal Fold Movement History    Relevant Medical History Patient has reflux;Patient uses inhalers;Patient is diagnosed with asthma  allergy  -EK        Reported Symptoms Characteristic of Paradoxical Vocal Fold Movement Runs out of air when speaking;Feels different than asthma  -EK        Triggers Allergies;Temperature changes;Perfume;Chemicals  -EK        Symptoms Chronic coughing;Hoarseness  -EK           Vocal Hygiene    Daily Water Intake 3-4 glasses (17-32 oz.)  -EK        Daily Caffeine Intake Other (see comments)  inés  -EK        Daily Alcohol Servings 0  -EK        Smoking History Nonsmoker  -EK        Vocal Abuses None reported  -EK        Environmental Issues Exposure to allergens  -EK        Pulmonary Status Asthma  -EK        Reflux History Patient takes reflux medications  -EK        Typical Voice Usage/Activities Casual tian  -EK           Voice Assessment/Intervention    Muscle Tension Observation Neck  -EK           Voice Handicap Index (VHI)    Functional Subtest 16  -EK        Physical Subtest 22  -EK        Emotional Subtest 11  -EK        VHI TOTAL SCORE 49  -EK           SLP Clinical Impression    SLP Voice Diagnosis mild to moderate voice disorder  -EK        Rehab Potential/Prognosis good  -EK        SLP Criteria for Skilled Therapy Intervention yes  -EK        Functional Impact difficulty communicating wants, needs  -EK           SLP Voice Recommendations    SLP Voice Therapy Frequency 1 day per week  -EK        Predicted Duration Therapy Intervention (Days) until discharge  -EK        SLP Voice Recommended and Strategies Vocal hygiene techniques;Reflux precautions  -EK           Voice Goal Pt to Use Voice in Vocational and Avocational Activities    Time Frame (Vocal Hygiene To develop an awareness of behaviors and lifestyle) by discharge  -EK        Progress/Outcomes (Using voice in vocational and avocational activities) new goal  -EK           Voice Goal Pt to use laryngeal  massage and/or other relaxation techniques for the external muscles of the neck, shoulders and chest    Time Frame (Pt to reduce hyperfunctional use of the vocal mechanism via laryngeal massage and/or other relaxation techniques for the external muscles of the neck, shoulders and chest by discharge  -EK        Progress (Pt reducing hyperfunctional use of the vocal mechanism) 90%;independently (over 90% accuracy)  -EK        Progress/Outcomes (Pt using laryngeal massage and/or other relaxation techniques for the external muscles of the neck, shoulders and chest) new goal  -EK           Voice Goal Pt will reduce hyperfunctional use of voice by performing Vocal Function Exercises    Time Frame (Pt will reduce hyperfunctional use of voice by performing Vocal Function Exercises) by discharge  -EK        Progress (Pt will reduce hyperfunctional use of voice by performing Vocal Function Exercises) 100%;independently (over 90% accuracy)  -EK        Progress/Outcomes (For Pt to reduce hyperfunctional use of voice by performing Vocal Function Exercises new goal  -EK              User Key  (r) = Recorded By, (t) = Taken By, (c) = Cosigned By    Initials Name Effective Dates    Kia Ledesma CCC-SLP 06/16/21 -                                OP SLP Education     Row Name 10/24/22 1315       Education    Barriers to Learning No barriers identified  -EK    Education Provided Described results of evaluation  -EK    Learning Motivation Strong  -EK    Learning Method Demonstration;Explanation;Written materials  -EK    Teaching Response Verbalized understanding;Demonstrated understanding  -EK    Education Comments SLP discussed purpose of evaluation and exercises to address laryngeal tension.  -EK          User Key  (r) = Recorded By, (t) = Taken By, (c) = Cosigned By    Initials Name Effective Dates    Kia Ledesma CCC-[facility] 06/16/21 -                        Time Calculation:   SLP Start Time:  1315  SLP Stop Time: 1344  SLP Time Calculation (min): 29 min  Total Timed Code Minutes- SLP: 29 minute(s)    Therapy Charges for Today     Code Description Service Date Service Provider Modifiers Qty    78636025110  ST [facility] VOICE AND RESONC 2 10/24/2022 Kia Pan, GAYATHRI-SLP GN 1                     Kia Pan CCC-SLP  10/24/2022

## 2022-10-31 ENCOUNTER — APPOINTMENT (OUTPATIENT)
Dept: SPEECH THERAPY | Facility: HOSPITAL | Age: 77
End: 2022-10-31

## 2022-11-02 ENCOUNTER — HOSPITAL ENCOUNTER (OUTPATIENT)
Dept: SPEECH THERAPY | Facility: HOSPITAL | Age: 77
Setting detail: THERAPIES SERIES
Discharge: HOME | End: 2022-11-02

## 2022-11-02 DIAGNOSIS — R49.0 DYSPHONIA: ICD-10-CM

## 2022-11-02 PROCEDURE — 92507 TX SP LANG VOICE COMM INDIV: CPT | Performed by: SPEECH-LANGUAGE PATHOLOGIST

## 2022-11-02 NOTE — THERAPY TREATMENT NOTE
Outpatient Speech Language Pathology   Adult/Peds Voice Treatment Note  [facility]     Patient Name: Preethi Serrano  : 1945  MRN: [id number]  Today's Date: 2022           Visit Date: 2022      Patient Active Problem List   Diagnosis   • Streptococcus infection       Visit Dx:    ICD-10-CM ICD-9-CM   1. Dysphonia  R49.0 784.42        OP SLP Assessment/Plan - 22 1349        SLP Assessment    Functional Problems Voice  -EK    Impact on Function- Voice Restrictions in personal and social life  -EK    Clinical Impression- Voice Mild voice disorder  -EK    Functional Problems Comment muscle tension dysphonia  -EK    Clinical Impression Comments Pt reports slight difficulty with neck ROM due to neck stiffness therefore SLP just recommended gentle stretch to her comfort with no pain. SLP added progressive muscle relaxation and deep breathing exercises to decrease MTD. SLP also demonstrated and introduced semi occluded vocal tract exercises. Pt was also given a reference to review at home for SOVT.  -EK    Prognosis Good (comment)  -EK    Patient/caregiver participated in establishment of treatment plan and goals Yes  -EK    Patient would benefit from skilled therapy intervention Yes  -EK       SLP Plan    Frequency 1 time per week  -EK    Duration 4 weeks  -EK    Plan Comments Continue plan of care  -EK          User Key  (r) = Recorded By, (t) = Taken By, (c) = Cosigned By    Initials Name Provider Type    Kia Ledesma CCC-SLP Speech and Language Pathologist                  VOICE ASSESSMENT (last 72 hours)     Voice Assessment     Row Name 22 9042                   Voice Assessment/Intervention    Document Type therapy note (daily note)  -EK        Subjective Information no complaints  -EK        Patient Observations alert;cooperative;agree to therapy  -EK        Patient/Family/Caregiver Comments/Observations none  -EK        Patient Effort good  -EK            General Information    Patient Profile Reviewed yes  -EK        Pertinent History Of Current Problem Feels like voice slightly improved  -EK        Current Method of Nutrition regular textures;thin liquids  -EK        Precautions/Limitations, Vision WFL  -EK        Precautions/Limitations, Hearing WFL  -EK        Prior Level of Function-Communication WFL  -EK        Plans/Goals Discussed with patient  -EK           Voice Handicap Index    Functional Subtest Score 16  -EK        Physical Subtest Score 22  -EK        Emotional Subtest Score 11  -EK           Pain Scale: Numbers Pre/Post-Treatment    Pretreatment Pain Rating 0/10 - no pain  -EK        Posttreatment Pain Rating 0/10 - no pain  -EK           Voice Handicap Index (VHI)    Functional Subtest 16  -EK        Physical Subtest 22  -EK        Emotional Subtest 11  -EK        VHI TOTAL SCORE 49  -EK        VHI Comments moderate  -EK           SLP Clinical Impression    SLP Voice Diagnosis mild voice disorder  -EK        Rehab Potential/Prognosis good  -EK        SLP Criteria for Skilled Therapy Intervention yes  -EK        Functional Impact difficulty communicating wants, needs  -EK           SLP Voice Recommendations    SLP Voice Therapy Frequency 1 day per week  -EK        Predicted Duration Therapy Intervention (Days) until discharge  -EK        SLP Voice Recommended and Strategies Vocal hygiene techniques  -EK           Voice Goal Awareness of Behaviors and Lifestyle    Time Frame (Vocal Hygiene To develop an awareness of behaviors and lifestyle) by discharge  -EK           Voice Goal Pt to Use Voice in Vocational and Avocational Activities    Progress/Outcomes (Using voice in vocational and avocational activities) new goal  -EK           Voice Goal Pt to use laryngeal massage and/or other relaxation techniques for the external muscles of the neck, shoulders and chest    Time Frame (Pt to reduce hyperfunctional use of the vocal mechanism via laryngeal  massage and/or other relaxation techniques for the external muscles of the neck, shoulders and chest by discharge  -EK        Progress (Pt reducing hyperfunctional use of the vocal mechanism) 90%;independently (over 90% accuracy)  -EK        Progress/Outcomes (Pt using laryngeal massage and/or other relaxation techniques for the external muscles of the neck, shoulders and chest) goal not met  -EK           Voice Goal Pt will reduce hyperfunctional use of voice by performing Vocal Function Exercises    Time Frame (Pt will reduce hyperfunctional use of voice by performing Vocal Function Exercises) by discharge  -EK        Progress (Pt will reduce hyperfunctional use of voice by performing Vocal Function Exercises) 100%;independently (over 90% accuracy)  -EK        Progress/Outcomes (For Pt to reduce hyperfunctional use of voice by performing Vocal Function Exercises goal not met  -EK              User Key  (r) = Recorded By, (t) = Taken By, (c) = Cosigned By    Initials Name Effective Dates    Kia Ledesma CCC-SLP 06/16/21 -                                    OP SLP Education     Row Name 11/02/22 1349       Education    Barriers to Learning No barriers identified  -EK    Education Provided Patient demonstrated recommended strategies  -EK    Assessed Learning motivation  -EK    Learning Motivation Strong  -EK    Learning Method Explanation;Demonstration  -EK    Teaching Response Verbalized understanding;Demonstrated understanding  -EK    Education Comments Pt demonstrates understanding of exercises, SOVT exercises, and relaxation techniques to help reduce MTD.  -EK          User Key  (r) = Recorded By, (t) = Taken By, (c) = Cosigned By    Initials Name Effective Dates    Kia Ledesma CCC-SLP 06/16/21 -                     Time Calculation:   SLP Start Time: 1349  SLP Stop Time: 1429  SLP Time Calculation (min): 40 min  Total Timed Code Minutes- SLP: 40 minute(s)    Therapy  Charges for Today     Code Description Service Date Service Provider Modifiers Qty    16090156238  ST TREATMENT SPEECH 3 11/2/2022 Kia Pan CCC-SLP GN 1                     WILLY Ward  11/2/2022

## 2022-11-11 ENCOUNTER — HOSPITAL ENCOUNTER (OUTPATIENT)
Dept: SPEECH THERAPY | Facility: HOSPITAL | Age: 77
Setting detail: THERAPIES SERIES
Discharge: HOME | End: 2022-11-11

## 2022-11-11 DIAGNOSIS — R49.0 DYSPHONIA: ICD-10-CM

## 2022-11-11 PROCEDURE — 92507 TX SP LANG VOICE COMM INDIV: CPT | Performed by: SPEECH-LANGUAGE PATHOLOGIST

## 2022-11-11 NOTE — THERAPY DISCHARGE NOTE
Outpatient Speech Language Pathology   Adult/Peds Voice Treatment Note/Discharge Summary  [facility]     Patient Name: Preethi Serrano  : 1945  MRN: [id number]  Today's Date: 2022           Visit Date: 2022      Patient Active Problem List   Diagnosis   • Streptococcus infection       Visit Dx:    ICD-10-CM ICD-9-CM   1. Dysphonia  R49.0 784.42        OP SLP Assessment/Plan - 22 1500        SLP Assessment    Functional Problems Voice  -EK    Impact on Function- Voice Restrictions in personal and social life  -EK    Functional Problems Comment muscle tension dysphonia   Pt wants to discover underylying cause of cough which is affecting vocal quality. -EK    Clinical Impression Comments Pt reports excessive coughing again which is affecting her speech and voice. Pt recently saw pulmology for tests for asthma. Pt feels like until the coughing improves her voice is going to be affected. SLP in agreement. SLP encouaraged pt to continue all exercises to address muscle tension dysphonia and continue good vocal hygiene program. Pt is independent with all strategies to help decrease muscle tension dysphonia.  -EK       SLP Plan    Plan Comments ST to discontinue from servcies per pt's request and max potential at this time due to excessive nature of coughing pt's voice is being continually impacted. SLP instructed pt to call if any needs arise or if she wants to return to speech therapy.  -EK          User Key  (r) = Recorded By, (t) = Taken By, (c) = Cosigned By    Initials Name Provider Type    Kia Ledesma CCC-SLP Speech and Language Pathologist                     SLP SLC Evaluation - 22 1350        Communication Assessment/Intervention    Document Type therapy note (daily note)  -EK    Patient Effort good  -EK       General Information    Patient Profile Reviewed yes  -EK    Precautions/Limitations, Vision WFL  -EK    Precautions/Limitations, Hearing WFL  -EK     Plans/Goals Discussed with patient  -EK       Pain Scale: Numbers Pre/Post-Treatment    Pretreatment Pain Rating 0/10 - no pain  -EK    Posttreatment Pain Rating 0/10 - no pain  -EK          User Key  (r) = Recorded By, (t) = Taken By, (c) = Cosigned By    Initials Name Provider Type    Kia Ledesma CCC-SLP Speech and Language Pathologist                                 OP SLP Education     Row Name 11/11/22 1500       Education    Barriers to Learning No barriers identified  -EK    Education Provided Patient demonstrated recommended strategies  -EK    Assessed Learning motivation  -EK    Learning Motivation Strong  -EK    Learning Method Demonstration  -EK    Teaching Response Verbalized understanding;Demonstrated understanding  -EK    Education Comments Pt independent with all exerciseses and strategies to dcrease laryngeal tension.  -EK          User Key  (r) = Recorded By, (t) = Taken By, (c) = Cosigned By    Initials Name Effective Dates    Kia Ledesma CCC-SLP 06/16/21 -                       Time Calculation:   SLP Start Time: 1350  SLP Stop Time: 1425  SLP Time Calculation (min): 35 min  Total Timed Code Minutes- SLP: 35 minute(s)    Therapy Charges for Today     Code Description Service Date Service Provider Modifiers Qty    67064070818 HC ST TREATMENT SPEECH 2 11/11/2022 Kia Pan CCC-SLP GN 1                      WILLY Ward  11/11/2022

## 2023-09-13 ENCOUNTER — PREP FOR SURGERY (OUTPATIENT)
Dept: OTHER | Facility: HOSPITAL | Age: 78
End: 2023-09-13
Payer: MEDICARE

## 2023-09-13 DIAGNOSIS — Z12.11 ENCOUNTER FOR SCREENING FOR MALIGNANT NEOPLASM OF COLON: Primary | ICD-10-CM

## 2023-09-13 RX ORDER — DEXTROSE AND SODIUM CHLORIDE 5; .45 G/100ML; G/100ML
30 INJECTION, SOLUTION INTRAVENOUS CONTINUOUS PRN
OUTPATIENT
Start: 2023-09-13

## 2023-09-21 ENCOUNTER — ANESTHESIA EVENT (OUTPATIENT)
Dept: GASTROENTEROLOGY | Facility: HOSPITAL | Age: 78
End: 2023-09-21
Payer: MEDICARE

## 2023-09-21 ENCOUNTER — HOSPITAL ENCOUNTER (OUTPATIENT)
Facility: HOSPITAL | Age: 78
Setting detail: HOSPITAL OUTPATIENT SURGERY
Discharge: HOME | End: 2023-09-21
Attending: INTERNAL MEDICINE | Admitting: INTERNAL MEDICINE
Payer: MEDICARE

## 2023-09-21 ENCOUNTER — ANESTHESIA (OUTPATIENT)
Dept: GASTROENTEROLOGY | Facility: HOSPITAL | Age: 78
End: 2023-09-21
Payer: MEDICARE

## 2023-09-21 VITALS
TEMPERATURE: 97.6 F | DIASTOLIC BLOOD PRESSURE: 64 MMHG | HEIGHT: 60 IN | RESPIRATION RATE: 18 BRPM | SYSTOLIC BLOOD PRESSURE: 147 MMHG | OXYGEN SATURATION: 95 % | WEIGHT: 202 LBS | BODY MASS INDEX: 39.66 KG/M2 | HEART RATE: 90 BPM

## 2023-09-21 DIAGNOSIS — Z12.11 ENCOUNTER FOR SCREENING FOR MALIGNANT NEOPLASM OF COLON: ICD-10-CM

## 2023-09-21 PROCEDURE — 25010000002 PROPOFOL 10 MG/ML EMULSION

## 2023-09-21 RX ORDER — DEXTROSE AND SODIUM CHLORIDE 5; .45 G/100ML; G/100ML
30 INJECTION, SOLUTION INTRAVENOUS CONTINUOUS PRN
Status: DISCONTINUED | OUTPATIENT
Start: 2023-09-21 | End: 2023-09-21 | Stop reason: HOSPADM

## 2023-09-21 RX ORDER — LIDOCAINE HYDROCHLORIDE 20 MG/ML
INJECTION, SOLUTION EPIDURAL; INFILTRATION; INTRACAUDAL; PERINEURAL AS NEEDED
Status: DISCONTINUED | OUTPATIENT
Start: 2023-09-21 | End: 2023-09-21 | Stop reason: SURG

## 2023-09-21 RX ORDER — PROPOFOL 10 MG/ML
VIAL (ML) INTRAVENOUS AS NEEDED
Status: DISCONTINUED | OUTPATIENT
Start: 2023-09-21 | End: 2023-09-21 | Stop reason: SURG

## 2023-09-21 RX ADMIN — PROPOFOL 20 MG: 10 INJECTION, EMULSION INTRAVENOUS at 10:15

## 2023-09-21 RX ADMIN — PROPOFOL 20 MG: 10 INJECTION, EMULSION INTRAVENOUS at 10:11

## 2023-09-21 RX ADMIN — PROPOFOL 20 MG: 10 INJECTION, EMULSION INTRAVENOUS at 10:00

## 2023-09-21 RX ADMIN — PROPOFOL 20 MG: 10 INJECTION, EMULSION INTRAVENOUS at 10:06

## 2023-09-21 RX ADMIN — PROPOFOL 20 MG: 10 INJECTION, EMULSION INTRAVENOUS at 10:16

## 2023-09-21 RX ADMIN — PROPOFOL 20 MG: 10 INJECTION, EMULSION INTRAVENOUS at 10:07

## 2023-09-21 RX ADMIN — PROPOFOL 20 MG: 10 INJECTION, EMULSION INTRAVENOUS at 10:18

## 2023-09-21 RX ADMIN — PROPOFOL 100 MG: 10 INJECTION, EMULSION INTRAVENOUS at 09:59

## 2023-09-21 RX ADMIN — PROPOFOL 20 MG: 10 INJECTION, EMULSION INTRAVENOUS at 10:04

## 2023-09-21 RX ADMIN — PROPOFOL 20 MG: 10 INJECTION, EMULSION INTRAVENOUS at 10:17

## 2023-09-21 RX ADMIN — LIDOCAINE HYDROCHLORIDE 50 MG: 20 INJECTION, SOLUTION EPIDURAL; INFILTRATION; INTRACAUDAL; PERINEURAL at 09:59

## 2023-09-21 RX ADMIN — PROPOFOL 20 MG: 10 INJECTION, EMULSION INTRAVENOUS at 10:13

## 2023-09-21 RX ADMIN — PROPOFOL 20 MG: 10 INJECTION, EMULSION INTRAVENOUS at 10:02

## 2023-09-21 RX ADMIN — DEXTROSE AND SODIUM CHLORIDE 30 ML/HR: 5; 450 INJECTION, SOLUTION INTRAVENOUS at 09:49

## 2023-09-21 RX ADMIN — PROPOFOL 20 MG: 10 INJECTION, EMULSION INTRAVENOUS at 10:09

## 2023-09-21 RX ADMIN — PROPOFOL 20 MG: 10 INJECTION, EMULSION INTRAVENOUS at 10:10

## 2023-09-21 RX ADMIN — PROPOFOL 20 MG: 10 INJECTION, EMULSION INTRAVENOUS at 10:08

## 2023-09-21 RX ADMIN — PROPOFOL 20 MG: 10 INJECTION, EMULSION INTRAVENOUS at 10:01

## 2023-09-21 RX ADMIN — PROPOFOL 20 MG: 10 INJECTION, EMULSION INTRAVENOUS at 10:14

## 2023-09-21 RX ADMIN — PROPOFOL 20 MG: 10 INJECTION, EMULSION INTRAVENOUS at 10:03

## 2023-09-21 NOTE — H&P
Juan David Sellers DO,[facility]  Gastroenterology  Hepatology  Endoscopy  Board Certified in Internal Medicine and gastroenterology  [address], [address]  [place]. [address]  - [phone]   F - [phone]     GASTROENTEROLOGY HISTORY AND PHYSICAL  NOTE   JUAN DAVID SELLERS DO.         SUBJECTIVE:   9/21/2023    Name: Preethi Serrano  DOD: 1945        Chief Complaint:         Subjective: Screening for colon cancer    Patient is 78 y.o. female presents with desire for elective colonoscopy.      ROS/HISTORY/ CURRENT MEDICATIONS/OBJECTIVE/VS/PE:   Review of Systems:  All systems unremarkable unless specified below.  Constitutional   HENT  Eyes   Respiratory    Cardiovascular  Gastrointestinal   Endocrine  Genitourinary    Musculoskeletal   Skin  Allergic/Immunologic    Neurological    Hematological  Psychiatric/Behavioral    History:     Past Medical History:   Diagnosis Date    Acute bronchitis     Calcaneovalgus deformity of foot     Cough     H/O bone density study     [id number]    Hammer toe     Hyperlipidemia     Ingrowing nail     Other hammer toe(s) (acquired), unspecified foot     Pes planus     Pneumonia     Rash and other nonspecific skin eruption     Screening for osteoporosis      Past Surgical History:   Procedure Laterality Date    ANKLE SURGERY      CHOLECYSTECTOMY      FOOT SURGERY  11/19/2013    Hammertoe correction second toe left foot with absorbable pin. Flexor tenotomy left third toe. Partial permanent nail avulsion both borders left hallux    HEMORRHOIDECTOMY      HYSTERECTOMY      NOSE SURGERY       No family history on file.  Social History     Tobacco Use    Smoking status: Never   Substance Use Topics    Alcohol use: Never    Drug use: Never     Prior to Admission medications    Medication Sig Start Date End Date Taking? Authorizing Provider   albuterol (PROVENTIL HFA;VENTOLIN HFA) 108 (90 Base) MCG/ACT inhaler Inhale 2 puffs Every 4 (Four) Hours As Needed for Wheezing.    Yes Maninder Ramirez MD   ascorbic acid (VITAMIN C) 500 MG tablet Take 1 tablet by mouth Daily.   Yes Maninder Ramirez MD   atorvastatin (LIPITOR) 10 MG tablet TAKE 1 TABLET(10 MG) BY MOUTH EVERY EVENING 9/19/22  Yes Maninder Ramirez MD   Cetirizine HCl (ZYRTEC ALLERGY PO) Take  by mouth.   Yes Maninder Ramirez MD   Cyanocobalamin (VITAMIN B 12 PO) Take  by mouth.   Yes Maninder Ramirez MD   famotidine (Pepcid) 20 MG tablet Take 2 tablets by mouth Every Night. 10/17/22  Yes Ailyn Benson MD   fluticasone (VERAMYST) 27.5 MCG/SPRAY nasal spray 2 sprays into the nostril(s) as directed by provider Daily.   Yes Maninder Ramirez MD   ipratropium (ATROVENT) 0.03 % nasal spray 2 sprays into the nostril(s) as directed by provider. 9/1/22  Yes Maninder Ramirez MD   latanoprost (XALATAN) 0.005 % ophthalmic solution    Yes Maninder Ramirez MD   losartan-hydrochlorothiazide (HYZAAR) 50-12.5 MG per tablet Take 1 tablet by mouth Daily. 10/3/22 10/4/23 Yes Maninder Ramirez MD   MAGNESIUM PO Take  by mouth.   Yes Maninder Ramirez MD   montelukast (SINGULAIR) 10 MG tablet Take 1 tablet by mouth. 10/3/22 10/4/23 Yes Maninder Ramirez MD   Multiple Vitamins-Minerals (MULTIVITAMIN ADULT PO) Take  by mouth.   Yes Maninder Ramirez MD   OMEGA-3 FATTY ACIDS PO Take  by mouth.   Yes Maninder Ramirez MD   omeprazole OTC (PriLOSEC OTC) 20 MG EC tablet Take 1 tablet by mouth Daily.   Yes Maninder Ramirez MD   venlafaxine (EFFEXOR) 37.5 MG tablet Take 1 tablet by mouth Daily. 9/1/22  Yes Maninder Ramirez MD   vitamin E 400 UNIT capsule    Yes Maninder Ramirez MD   Zinc 30 MG tablet    Yes Maninder Ramirez MD   fenofibrate 160 MG tablet Take 1 tablet by mouth. 5/6/22 5/7/23  Maninder Ramirez MD     Allergies:  Penicillins, Pneumococcal vaccines, and Quibron-t [theophylline]    I have reviewed the patients medical history, surgical history and family history  "in the available medical record system.     Current Medications:     Current Facility-Administered Medications   Medication Dose Route Frequency Provider Last Rate Last Admin    dextrose 5 % and sodium chloride 0.45 % infusion  30 mL/hr Intravenous Continuous PRN Wade Singh DO           Objective     Physical Exam:    /64   Pulse 90   Temp 97.6 °F (36.4 °C)   Resp 18   Ht 152.4 cm (60\")   Wt 91.6 kg (202 lb)   SpO2 95%   BMI 39.45 kg/m²      Physical Exam:  General Appearance:    Alert, cooperative, in no acute distress   Head:    Normocephalic, without obvious abnormality, atraumatic   Eyes:            Lids and lashes normal, conjunctivae and sclerae normal, no icterus, no pallor, corneas clear, PERRLA   Ears:    Ears appear intact with no abnormalities noted   Throat:   No oral lesions, no thrush, oral mucosa moist   Neck:   No adenopathy, supple, trachea midline, no thyromegaly, no  carotid bruit, no JVD   Back:     No kyphosis present, no scoliosis present, no skin lesions,   erythema or scars, no tenderness to percussion or                 palpation,  range of motion normal   Lungs:     Clear to auscultation,respirations regular, even and         unlabored    Heart:    Regular rhythm and normal rate, normal S1 and S2, no  murmur, no gallop, no rub, no click   Breast Exam:    Deferred   Abdomen:     Normal bowel sounds, no masses, no organomegaly, soft  nontender, nondistended, no guarding, no rebound                 tenderness   Genitalia:    Deferred   Extremities:   Moves all extremities well, no edema, no cyanosis, no          redness   Pulses:   Pulses palpable and equal bilaterally   Skin:   No bleeding, bruising or rash   Lymph nodes:   No palpable adenopathy   Neurologic:   Cranial nerves 2 - 12 grossly intact, sensation intact, DTR     present and equal bilaterally      Results Review:     Lab Results   Component Value Date    WBC 20.54 (H) 09/21/2020    WBC 9 10/21/2019    WBC 8.5 " 10/16/2018    HGB 9.7 (L) 09/21/2020    HGB 13.2 10/21/2019    HGB 13.7 10/16/2018    HCT 29.9 (L) 09/21/2020    HCT 40.9 10/21/2019    HCT 41.8 10/16/2018     (H) 09/21/2020     10/21/2019     10/16/2018             No results found for: LIPASE  Lab Results   Component Value Date    INR 1.14 09/29/2020     No results found for: CULTURE    Radiology Review:  Imaging Results (Last 72 Hours)       ** No results found for the last 72 hours. **             I reviewed the patient's new clinical results.  I reviewed the patient's new imaging results and agree with the interpretation.     ASSESSMENT/PLAN:   ASSESSMENT:  1.  Screening for colon cancer    PLAN:  1.  Colonoscopy    Risk and benefits associated with the procedure are reviewed with the patient.  The patient wished to proceed     Wade Singh DO  09/21/23  09:40 CDT

## 2023-09-21 NOTE — ANESTHESIA POSTPROCEDURE EVALUATION
Patient: Preethi Serrano    Procedure Summary       Date: 09/21/23 Room / Location: [facility] ENDOSCOPY 2 / [facility] ENDOSCOPY    Anesthesia Start: 0953 Anesthesia Stop:     Procedure: COLONOSCOPY 10:30 Diagnosis:       Encounter for screening for malignant neoplasm of colon      (Encounter for screening for malignant neoplasm of colon [Z12.11])    Surgeons: Wade Singh DO Provider: Sherice Barahona CRNA    Anesthesia Type: general ASA Status: 2            Anesthesia Type: general    Vitals  No vitals data found for the desired time range.          Post Anesthesia Care and Evaluation    Patient location during evaluation: bedside  Patient participation: waiting for patient participation  Level of consciousness: responsive to verbal stimuli  Pain management: adequate    Airway patency: patent  Anesthetic complications: No anesthetic complications  PONV Status: none  Cardiovascular status: acceptable  Respiratory status: acceptable  Hydration status: acceptable    Comments: -------------------------              09/21/23                    0944        -------------------------   BP:       (!) 195/86      Pulse:        92          Resp:         18          Temp:   96.8 °F (36 °C)   SpO2:         95%        -------------------------

## 2023-09-21 NOTE — ANESTHESIA PREPROCEDURE EVALUATION
Anesthesia Evaluation     Patient summary reviewed and Nursing notes reviewed   no history of anesthetic complications:   NPO Solid Status: > 8 hours  NPO Liquid Status: > 2 hours           Airway   Mallampati: II  TM distance: >3 FB  Neck ROM: full  No difficulty expected  Dental    (+) poor dentition    Pulmonary     breath sounds clear to auscultation  (+) pneumonia ,  Cardiovascular     ECG reviewed  Rhythm: regular  Rate: normal    (+) hyperlipidemia    ROS comment: 9/20 EKG:  Vent. Rate : 079 BPM     Atrial Rate : 079 BPM     P-R Int : 152 ms          QRS Dur : 086 ms      QT Int : 364 ms       P-R-T Axes : 051 040 082 degrees     QTc Int : 417 ms     Normal sinus rhythm  Possible Left atrial enlargement  Septal infarct , age undetermined  Abnormal ECG      Neuro/Psych  GI/Hepatic/Renal/Endo    (+) obesity, GERD    Musculoskeletal     Abdominal   (+) obese   Substance History      OB/GYN          Other                        Anesthesia Plan    ASA 2     general   total IV anesthesia  intravenous induction     Anesthetic plan, risks, benefits, and alternatives have been provided, discussed and informed consent has been obtained with: patient.    Plan discussed with CRNA.      CODE STATUS:

## (undated) DEVICE — CANN SMPL SOFTECH BIFLO ETCO2 A/M 7FT